# Patient Record
Sex: MALE | Race: WHITE | Employment: FULL TIME | URBAN - METROPOLITAN AREA
[De-identification: names, ages, dates, MRNs, and addresses within clinical notes are randomized per-mention and may not be internally consistent; named-entity substitution may affect disease eponyms.]

---

## 2017-11-22 ENCOUNTER — RECORDS - HEALTHEAST (OUTPATIENT)
Dept: LAB | Facility: CLINIC | Age: 47
End: 2017-11-22

## 2017-11-22 LAB
CHOLEST SERPL-MCNC: 139 MG/DL
FASTING STATUS PATIENT QL REPORTED: YES
HDLC SERPL-MCNC: 49 MG/DL
HIV 1+2 AB+HIV1 P24 AG SERPL QL IA: NEGATIVE
LDLC SERPL CALC-MCNC: 82 MG/DL
PSA SERPL-MCNC: 0.9 NG/ML (ref 0–2.5)
TRIGL SERPL-MCNC: 42 MG/DL

## 2017-11-24 LAB
HSV1 IGG SERPL QL IA: POSITIVE
HSV2 IGG SERPL QL IA: NEGATIVE
SYPHILIS RPR SCREEN - HISTORICAL: NORMAL

## 2019-01-28 ENCOUNTER — RECORDS - HEALTHEAST (OUTPATIENT)
Dept: LAB | Facility: CLINIC | Age: 49
End: 2019-01-28

## 2019-01-28 LAB
ANION GAP SERPL CALCULATED.3IONS-SCNC: 8 MMOL/L (ref 5–18)
BUN SERPL-MCNC: 22 MG/DL (ref 8–22)
CALCIUM SERPL-MCNC: 9.2 MG/DL (ref 8.5–10.5)
CHLORIDE BLD-SCNC: 106 MMOL/L (ref 98–107)
CO2 SERPL-SCNC: 28 MMOL/L (ref 22–31)
CREAT SERPL-MCNC: 0.88 MG/DL (ref 0.7–1.3)
GFR SERPL CREATININE-BSD FRML MDRD: >60 ML/MIN/1.73M2
GLUCOSE BLD-MCNC: 83 MG/DL (ref 70–125)
POTASSIUM BLD-SCNC: 4.4 MMOL/L (ref 3.5–5)
SODIUM SERPL-SCNC: 142 MMOL/L (ref 136–145)

## 2019-02-13 ENCOUNTER — HOSPITAL ENCOUNTER (EMERGENCY)
Facility: CLINIC | Age: 49
Discharge: HOME | End: 2019-02-13
Attending: EMERGENCY MEDICINE | Admitting: EMERGENCY MEDICINE
Payer: COMMERCIAL

## 2019-02-13 ENCOUNTER — APPOINTMENT (OUTPATIENT)
Dept: ULTRASOUND IMAGING | Facility: CLINIC | Age: 49
End: 2019-02-13
Attending: EMERGENCY MEDICINE
Payer: COMMERCIAL

## 2019-02-13 VITALS
DIASTOLIC BLOOD PRESSURE: 90 MMHG | TEMPERATURE: 98.1 F | HEART RATE: 76 BPM | RESPIRATION RATE: 16 BRPM | HEIGHT: 74 IN | SYSTOLIC BLOOD PRESSURE: 136 MMHG | OXYGEN SATURATION: 96 % | WEIGHT: 185 LBS | BODY MASS INDEX: 23.74 KG/M2

## 2019-02-13 DIAGNOSIS — M79.662 PAIN OF LEFT CALF: ICD-10-CM

## 2019-02-13 PROCEDURE — 99284 EMERGENCY DEPT VISIT MOD MDM: CPT | Mod: 25

## 2019-02-13 PROCEDURE — 29515 APPLICATION SHORT LEG SPLINT: CPT | Mod: LT

## 2019-02-13 PROCEDURE — 93971 EXTREMITY STUDY: CPT | Mod: LT

## 2019-02-13 ASSESSMENT — ENCOUNTER SYMPTOMS
NUMBNESS: 1
SHORTNESS OF BREATH: 0
FEVER: 0
CHILLS: 0
MYALGIAS: 1

## 2019-02-13 ASSESSMENT — MIFFLIN-ST. JEOR: SCORE: 1778.9

## 2019-02-13 NOTE — ED TRIAGE NOTES
Left ankle surgery last tues. Now having left calf pain was advised to be seen in ER. Left ankle covered with ace splint cms intact.

## 2019-02-13 NOTE — ED PROVIDER NOTES
"  History     Chief Complaint:  Calf pain    HPI   Marciano So is a 48 year old male, with a history of hypothyroidism, s/p left ankle reconstruction/tendon transfer, who presents to the emergency department for evaluation of left calf pain. The patient reports he recently had reconstructive left ankle surgery done by Dr. Dyer 8 days prior to evaluation at [facility]. He reports he started noticing some tightness, stiffness and pain in his left calf muscle the past two days and has noticed some tingling and numbness in his left toes. He denies any fever, chills, chest pain or shortness of breath. He notes he has had a recent fall but just hit his toes and does not believe he injured his foot or ankle. He denies any history of blood clots. He notes he has a follow up appointment with Dr. Dyer on 2/18/19.  He has been doing well with pain control, continues to remain non weight bearing, and has been using only OTC analgesia    Allergies:  No known drug allergies     Medications:    Levothyroxine     Past Medical History:    Hypothyroidism    Past Surgical History:    Left eye capsulotomy  Bilateral phacoemulsification with deluxe IOL  [place] teeth extraction  Left ankle reconstruction    Family History:    History reviewed. No pertinent family history.     Social History:  Smoking status: Never  Alcohol use: Yes  The patient presents to the emergency department by himself.  Marital Status:   [2]     Review of Systems   Constitutional: Negative for chills and fever.   Respiratory: Negative for shortness of breath.    Cardiovascular: Negative for chest pain.   Musculoskeletal: Positive for myalgias.   Neurological: Positive for numbness.   All other systems reviewed and are negative.      Physical Exam   Patient Vitals for the past 24 hrs:   BP Temp Temp src Pulse Resp SpO2 Height Weight   02/13/19 1424 136/90 98.1  F (36.7  C) Oral 76 16 96 % 1.88 m (6' 2\") 83.9 kg (185 lb)     Physical " Exam  General:              Well-nourished              Speaking in full sentences  Eyes:              Conjunctiva without injection or scleral icterus  ENT:              Moist mucous membranes              Nares patent              Pinnae normal  Neck:              Full ROM              No stiffness appreciated  Resp:              Lungs CTAB              No crackles, wheezing or audible rubs              Good air movement  CV:                    Normal rate, regular rhythm              S1 and S2 present              No murmur, gallop or rub  GI:              BS present              Abdomen soft without distention              Non-tender to light and deep palpation              No guarding or rebound tenderness  Skin:              Warm, dry, well perfused              No rashes or open wounds on exposed skin  MSK:              LLE:              Splint in place              Removed              Incision site to medial calf is C/D/I              Incision site to medial foot with mild surrounding erythema, no discharge, no tenderness, no wound dehiscence              Incision site to lateral foot with mild surrounding erythema, no discharge, no tenderness, no wound dehiscence              Tenderness to palpation to posterior calf               No overlying erythema, fluctuance, or expressible discharge               Compartments to medial, lateral and posterior aspect are soft throughout              Cap refill <3 sec              Easily dopplerable DP and PT pulse  Neuro:              Alert              Answers questions appropriately              Moves all extremities equally  Psych:              Normal affect, normal mood  Emergency Department Course   Imaging:  Radiographic findings were communicated with the patient who voiced understanding of the findings.    US Lower Extremity Venous Duplex Left  IMPRESSION:  1. No deep venous thrombosis identified.  2. Elongated complex fluid collection of the medial calf. This  may  well represent hematoma, but an infected fluid collection cannot be  excluded. Clinical correlation recommended.  As read by Radiology.    Procedures:   Splint Placement    PLACEMENT: Custom plaster short leg splint was applied to the left lower extremity and after placement I checked and adjusted the fit to ensure proper positioning. The patient was more comfortable with the splint in place. Sensation and circulation are intact after splint placement.     Emergency Department Course:  Past medical records, nursing notes, and vitals reviewed.  1435: I performed an exam of the patient and obtained history, as documented above.    The patient was sent for a lower extremity ultrasound while in the emergency department, findings above.    1613: I rechecked the patient. Explained findings to the patient.    1704: I applied a splint, as stated above.     1713: I discussed the patient with Dr. Garner, of orthopedics.    Findings and plan explained to the Patient. Patient discharged home with instructions regarding supportive care, medications, and reasons to return. The importance of close follow-up was reviewed.   Impression & Plan    Medical Decision Making:  Marciano So is a very pleasant 48 year old male presenting to the emergency department for evaluation of left calf pain. VS on presentation reveal mildly elevated BP, although otherwise are unremarkable. Patient is POD#8 following left ankle surgery with tendon transfer. Workup included venous ultrasound of the left lower extremity. This demonstrates findings of a long but complex fluid collection to the medial calf which may represent hematoma, but infected fluid cannot be excluded. Clinically, I do not feel this is representative of deeper space abscess or infection as patient exhibits no overlying erythema, discharge, purulent drainage, nor associated clinical symptoms of fevers or chills. There is no evidence of DVT on US. His splint was taken down and  dressing removed. His incision sites appear clean, dry and intact without evidence of drainage. He has mild surrounding erythema, which is likely post-operative wound healing. I do not feel this is consistent with cellulitis. His compartments are soft throughout, and his extremity is warm and well perfused with intact capillary refill, well controlled symptoms (no pain out of proportion to exam) and easily dopplerable DP and PT pulses. I feel this is unlikely to represent acute compartment syndrome. Case was discussed with [facility] who agreed that at this time no indication for emergent surgical intervention, but recommended supportive cares and elevation of the leg. Patient felt comfortable with this plan of care. Questions were answered and he is to follow up with his orthopedic doctor and his team with contact him tomorrow. At this time, his symptoms are well controlled. He is to return to the ER in the meantime with worsening pain, numbness, discoloration of his toes, or any other concerning symptoms. Questions answered prior to discharge.    Diagnosis:    ICD-10-CM   1. Pain of left calf M79.662     Disposition:  Discharged to home with instructions for follow up.  Ginger Ramirez  2/13/2019   [facility] EMERGENCY DEPARTMENT  Scribe Disclosure:  I, Ginger Ramirez, am serving as a scribe at 2:35 PM on 2/13/2019 to document services personally performed by Moreno Kang MD based on my observations and the provider's statements to me.      Moreno Kang MD  02/14/19 0017

## 2019-02-13 NOTE — ED AVS SNAPSHOT
[facility] Emergency Department  [address]  [facility] [address]  Phone:  [phone]  Fax:  [phone]                                    Marciano So   MRN: [id number]    Department:  [facility] Emergency Department   Date of Visit:  2/13/2019           After Visit Summary Signature Page    I have received my discharge instructions, and my questions have been answered. I have discussed any challenges I see with this plan with the nurse or doctor.    ..........................................................................................................................................  Patient/Patient Representative Signature      ..........................................................................................................................................  Patient Representative Print Name and Relationship to Patient    ..................................................               ................................................  Date                                   Time    ..........................................................................................................................................  Reviewed by Signature/Title    ...................................................              ..............................................  Date                                               Time          22EPIC Rev 08/18

## 2021-02-10 ENCOUNTER — RECORDS - HEALTHEAST (OUTPATIENT)
Dept: LAB | Facility: CLINIC | Age: 51
End: 2021-02-10

## 2021-02-10 LAB
CHOLEST SERPL-MCNC: 174 MG/DL
FASTING STATUS PATIENT QL REPORTED: NO
HDLC SERPL-MCNC: 50 MG/DL
LDLC SERPL CALC-MCNC: 92 MG/DL
PSA SERPL-MCNC: 0.6 NG/ML (ref 0–3.5)
TRIGL SERPL-MCNC: 162 MG/DL

## 2021-05-26 ENCOUNTER — RECORDS - HEALTHEAST (OUTPATIENT)
Dept: ADMINISTRATIVE | Facility: CLINIC | Age: 51
End: 2021-05-26

## 2022-03-13 PROCEDURE — 87209 SMEAR COMPLEX STAIN: CPT | Performed by: FAMILY MEDICINE

## 2022-03-13 PROCEDURE — 87506 IADNA-DNA/RNA PROBE TQ 6-11: CPT | Performed by: FAMILY MEDICINE

## 2022-03-13 PROCEDURE — 87493 C DIFF AMPLIFIED PROBE: CPT | Performed by: FAMILY MEDICINE

## 2022-03-13 PROCEDURE — 87329 GIARDIA AG IA: CPT | Performed by: FAMILY MEDICINE

## 2022-03-14 ENCOUNTER — LAB REQUISITION (OUTPATIENT)
Dept: LAB | Facility: CLINIC | Age: 52
End: 2022-03-14

## 2022-03-14 DIAGNOSIS — R19.7 DIARRHEA, UNSPECIFIED: ICD-10-CM

## 2022-03-14 LAB
C COLI+JEJUNI+LARI FUSA STL QL NAA+PROBE: NOT DETECTED
C DIFF TOX B STL QL: NEGATIVE
EC STX1 GENE STL QL NAA+PROBE: NOT DETECTED
EC STX2 GENE STL QL NAA+PROBE: NOT DETECTED
NOROV GI+II ORF1-ORF2 JNC STL QL NAA+PR: NOT DETECTED
RVA NSP5 STL QL NAA+PROBE: NOT DETECTED
SALMONELLA SP RPOD STL QL NAA+PROBE: NOT DETECTED
SHIGELLA SP+EIEC IPAH STL QL NAA+PROBE: NOT DETECTED
V CHOL+PARA RFBL+TRKH+TNAA STL QL NAA+PR: NOT DETECTED
Y ENTERO RECN STL QL NAA+PROBE: NOT DETECTED

## 2022-03-15 LAB
C PARVUM AG STL QL IA: NEGATIVE
G LAMBLIA AG STL QL IA: NEGATIVE
O+P STL MICRO: NEGATIVE

## 2024-07-31 ENCOUNTER — LAB REQUISITION (OUTPATIENT)
Dept: LAB | Facility: CLINIC | Age: 54
End: 2024-07-31

## 2024-07-31 DIAGNOSIS — Z80.42 FAMILY HISTORY OF MALIGNANT NEOPLASM OF PROSTATE: ICD-10-CM

## 2024-07-31 DIAGNOSIS — Z13.220 ENCOUNTER FOR SCREENING FOR LIPOID DISORDERS: ICD-10-CM

## 2024-07-31 PROCEDURE — 80061 LIPID PANEL: CPT | Performed by: FAMILY MEDICINE

## 2024-07-31 PROCEDURE — G0103 PSA SCREENING: HCPCS | Performed by: FAMILY MEDICINE

## 2024-08-01 LAB
CHOLEST SERPL-MCNC: 213 MG/DL
FASTING STATUS PATIENT QL REPORTED: ABNORMAL
HDLC SERPL-MCNC: 57 MG/DL
LDLC SERPL CALC-MCNC: 134 MG/DL
NONHDLC SERPL-MCNC: 156 MG/DL
PSA SERPL DL<=0.01 NG/ML-MCNC: 0.83 NG/ML (ref 0–3.5)
TRIGL SERPL-MCNC: 110 MG/DL

## 2024-12-11 ENCOUNTER — LAB REQUISITION (OUTPATIENT)
Dept: LAB | Facility: CLINIC | Age: 54
End: 2024-12-11

## 2024-12-11 DIAGNOSIS — E03.9 HYPOTHYROIDISM, UNSPECIFIED: ICD-10-CM

## 2024-12-11 PROCEDURE — 84443 ASSAY THYROID STIM HORMONE: CPT | Performed by: FAMILY MEDICINE

## 2024-12-11 PROCEDURE — 84439 ASSAY OF FREE THYROXINE: CPT | Performed by: FAMILY MEDICINE

## 2024-12-12 LAB
T4 FREE SERPL-MCNC: 1.29 NG/DL (ref 0.9–1.7)
TSH SERPL DL<=0.005 MIU/L-ACNC: 4.83 UIU/ML (ref 0.3–4.2)